# Patient Record
Sex: FEMALE | Race: BLACK OR AFRICAN AMERICAN | NOT HISPANIC OR LATINO | Employment: OTHER | URBAN - METROPOLITAN AREA
[De-identification: names, ages, dates, MRNs, and addresses within clinical notes are randomized per-mention and may not be internally consistent; named-entity substitution may affect disease eponyms.]

---

## 2017-12-25 ENCOUNTER — HOSPITAL ENCOUNTER (EMERGENCY)
Facility: OTHER | Age: 42
Discharge: HOME | End: 2017-12-25
Attending: EMERGENCY MEDICINE
Payer: MEDICAID

## 2017-12-25 VITALS
DIASTOLIC BLOOD PRESSURE: 72 MMHG | OXYGEN SATURATION: 97 % | HEIGHT: 69 IN | RESPIRATION RATE: 18 BRPM | HEART RATE: 88 BPM | WEIGHT: 293 LBS | TEMPERATURE: 98 F | SYSTOLIC BLOOD PRESSURE: 147 MMHG | BODY MASS INDEX: 43.4 KG/M2

## 2017-12-25 DIAGNOSIS — J45.901 EXACERBATION OF ASTHMA, UNSPECIFIED ASTHMA SEVERITY, UNSPECIFIED WHETHER PERSISTENT: Primary | ICD-10-CM

## 2017-12-25 DIAGNOSIS — R05.9 COUGH: ICD-10-CM

## 2017-12-25 LAB
B-HCG UR QL: NEGATIVE
CTP QC/QA: YES
FLUAV AG SPEC QL IA: NEGATIVE
FLUBV AG SPEC QL IA: NEGATIVE
SPECIMEN SOURCE: NORMAL

## 2017-12-25 PROCEDURE — 81025 URINE PREGNANCY TEST: CPT | Performed by: PHYSICIAN ASSISTANT

## 2017-12-25 PROCEDURE — 63600175 PHARM REV CODE 636 W HCPCS: Performed by: PHYSICIAN ASSISTANT

## 2017-12-25 PROCEDURE — 25000003 PHARM REV CODE 250: Performed by: PHYSICIAN ASSISTANT

## 2017-12-25 PROCEDURE — 87400 INFLUENZA A/B EACH AG IA: CPT | Mod: 59

## 2017-12-25 PROCEDURE — 99284 EMERGENCY DEPT VISIT MOD MDM: CPT | Mod: 25

## 2017-12-25 PROCEDURE — 25000242 PHARM REV CODE 250 ALT 637 W/ HCPCS: Performed by: PHYSICIAN ASSISTANT

## 2017-12-25 PROCEDURE — 94761 N-INVAS EAR/PLS OXIMETRY MLT: CPT

## 2017-12-25 PROCEDURE — 94640 AIRWAY INHALATION TREATMENT: CPT

## 2017-12-25 RX ORDER — IPRATROPIUM BROMIDE AND ALBUTEROL SULFATE 2.5; .5 MG/3ML; MG/3ML
3 SOLUTION RESPIRATORY (INHALATION)
Status: COMPLETED | OUTPATIENT
Start: 2017-12-25 | End: 2017-12-25

## 2017-12-25 RX ORDER — ACETAMINOPHEN 500 MG
1000 TABLET ORAL
Status: DISCONTINUED | OUTPATIENT
Start: 2017-12-25 | End: 2017-12-26 | Stop reason: HOSPADM

## 2017-12-25 RX ORDER — PREDNISONE 20 MG/1
60 TABLET ORAL
Status: COMPLETED | OUTPATIENT
Start: 2017-12-25 | End: 2017-12-25

## 2017-12-25 RX ORDER — ALBUTEROL SULFATE 90 UG/1
1-2 AEROSOL, METERED RESPIRATORY (INHALATION) EVERY 6 HOURS PRN
Qty: 1 INHALER | Refills: 0 | Status: SHIPPED | OUTPATIENT
Start: 2017-12-25 | End: 2018-12-25

## 2017-12-25 RX ORDER — PREDNISONE 20 MG/1
60 TABLET ORAL DAILY
Qty: 12 TABLET | Refills: 0 | Status: SHIPPED | OUTPATIENT
Start: 2017-12-26 | End: 2017-12-30

## 2017-12-25 RX ORDER — PROMETHAZINE HYDROCHLORIDE AND CODEINE PHOSPHATE 6.25; 1 MG/5ML; MG/5ML
5 SOLUTION ORAL
Status: COMPLETED | OUTPATIENT
Start: 2017-12-25 | End: 2017-12-25

## 2017-12-25 RX ADMIN — IPRATROPIUM BROMIDE AND ALBUTEROL SULFATE 3 ML: .5; 3 SOLUTION RESPIRATORY (INHALATION) at 05:12

## 2017-12-25 RX ADMIN — PROMETHAZINE HYDROCHLORIDE AND CODEINE PHOSPHATE 5 ML: 6.25; 1 SYRUP ORAL at 06:12

## 2017-12-25 RX ADMIN — PREDNISONE 60 MG: 20 TABLET ORAL at 06:12

## 2017-12-25 NOTE — ED NOTES
NEURO: Pt AAO x 4. Behavior and speech appropriate to situation.   CARDIAC: Regular rhythm auscultated  RESPIRATORY: Respirations even and unlabored. intermittent expiratory wheeze with fine rale to post RUL  MUSCULOSKELETAL: Active ROM noted to extremities

## 2017-12-25 NOTE — ED TRIAGE NOTES
Pt presents with cough, onset 1 week ago. Reports cough is sometimes productive with green sputum. Pt reports SOB after coughing, denies SOB at rest. Denies fever, NVD. HIV +, reports compliancy with medications. + CHF and asthma hx, reports hospitalization for asthma last year. Pt took theraflu and alkaseltzer cold plus without improvement. Pt in NAD.

## 2017-12-26 NOTE — ED PROVIDER NOTES
Encounter Date: 2017       History     Chief Complaint   Patient presents with    Cough     x1 week, worsening today, hx of asthma     Patient is a 42-year-old female with history of arthritis, asthma, CHF, HIV, and hypertension who presents to the emergency department with cough and wheezing.  Patient states over the last week her asthma has been acting up.  She reports cough with no sputum production.  She states she's been coughing so much that now the right side of her ribs are hurting.  She denies any lower extremity edema.  She states this feels occur typical asthma exacerbation.  She states she's been using her albuterol more frequently.  She states she does have history of HIV, but has been compliant with her antiviral medications.  She states she saw her PCP last month and was told that her numbers were good.  She denies any recent fevers, chills, or body aches.      The history is provided by the patient.     Review of patient's allergies indicates:  No Known Allergies  Past Medical History:   Diagnosis Date    Arthritis     Asthma     CHF (congestive heart failure)     HIV (human immunodeficiency virus infection)     Hypertension      Past Surgical History:   Procedure Laterality Date     SECTION       History reviewed. No pertinent family history.  Social History   Substance Use Topics    Smoking status: Never Smoker    Smokeless tobacco: Never Used    Alcohol use Yes      Comment: socially     Review of Systems   Constitutional: Negative for activity change, appetite change, chills, fatigue and fever.   HENT: Negative for congestion, ear discharge, ear pain, nosebleeds, postnasal drip, sore throat and trouble swallowing.    Respiratory: Positive for cough and wheezing.    Cardiovascular: Negative for chest pain.   Gastrointestinal: Negative for abdominal pain, blood in stool, constipation, diarrhea, nausea and vomiting.   Genitourinary: Negative for dysuria, flank pain and  hematuria.   Musculoskeletal: Negative for back pain, neck pain and neck stiffness.   Skin: Negative for rash and wound.   Neurological: Negative for dizziness, syncope, speech difficulty, weakness, light-headedness, numbness and headaches.       Physical Exam     Initial Vitals [12/25/17 1610]   BP Pulse Resp Temp SpO2   (!) 184/98 98 16 98.4 °F (36.9 °C) 98 %      MAP       126.67         Physical Exam    Nursing note and vitals reviewed.  Constitutional: She appears well-developed and well-nourished. She is not diaphoretic. She is Obese .  Non-toxic appearance. No distress.   HENT:   Head: Normocephalic.   Right Ear: Hearing, tympanic membrane, external ear and ear canal normal.   Left Ear: Hearing, tympanic membrane, external ear and ear canal normal.   Nose: Nose normal.   Mouth/Throat: Uvula is midline, oropharynx is clear and moist and mucous membranes are normal. Mucous membranes are not pale. No trismus in the jaw. No uvula swelling. No oropharyngeal exudate.   Eyes: Conjunctivae are normal. Pupils are equal, round, and reactive to light.   Neck: Normal range of motion.   Cardiovascular: Normal rate and regular rhythm.   No lower extremity edema   Pulmonary/Chest: No respiratory distress. She has wheezes (diffuse, expiratory bilaterally). She has no rhonchi. She has no rales. She exhibits no tenderness.   Abdominal: Soft. Bowel sounds are normal. There is no tenderness.   Lymphadenopathy:     She has no cervical adenopathy.   Neurological: She is alert and oriented to person, place, and time.   Skin: Skin is warm and dry. Capillary refill takes less than 2 seconds.   Psychiatric: She has a normal mood and affect.         ED Course   Procedures  Labs Reviewed   INFLUENZA A AND B ANTIGEN   POCT URINE PREGNANCY          X-Rays:   Independently Interpreted Readings:   Other Readings:  No acute cardiopulmonary process    Medical Decision Making:   Initial Assessment:   Urgent evaluation of a 42-year-old female  with history of hypertension, asthma, arthritis, CHF, and HIV who presents to the emergency department with cough and wheezing.  Patient is afebrile, nontoxic appearing, and hemodynamically stable.  On lung auscultation, there is diffuse expiratory wheezing bilaterally.  No lower extremity edema.  Patient reports being compliant with antiviral medications and states her last CD4 was good.  Patient states this feels like her typical asthma exacerbation.  Patient be treated with albuterol and prednisone.  Patient will be reevaluated.  ED Management:  On reevaluation, patient states she is feeling much better.  Chest x-ray reveals no acute cardiopulmonary process.  Patient is advised to follow-up with PCP or return to the emergency department with any worsening symptoms or concerns.  Other:   I have discussed this case with another health care provider.       <> Summary of the Discussion: Dr. Graf                   ED Course      Clinical Impression:   The primary encounter diagnosis was Exacerbation of asthma, unspecified asthma severity, unspecified whether persistent. A diagnosis of Cough was also pertinent to this visit.                           Iona Umanzor PA-C  12/25/17 1913

## 2018-01-01 ENCOUNTER — HOSPITAL ENCOUNTER (EMERGENCY)
Facility: OTHER | Age: 43
Discharge: HOME | End: 2018-01-01
Attending: EMERGENCY MEDICINE
Payer: MEDICAID

## 2018-01-01 VITALS
DIASTOLIC BLOOD PRESSURE: 65 MMHG | TEMPERATURE: 99 F | HEIGHT: 69 IN | HEART RATE: 96 BPM | OXYGEN SATURATION: 97 % | BODY MASS INDEX: 43.4 KG/M2 | SYSTOLIC BLOOD PRESSURE: 141 MMHG | WEIGHT: 293 LBS | RESPIRATION RATE: 20 BRPM

## 2018-01-01 DIAGNOSIS — K11.20 SIALOADENITIS: ICD-10-CM

## 2018-01-01 DIAGNOSIS — R05.9 COUGH: ICD-10-CM

## 2018-01-01 DIAGNOSIS — R07.81 RIB PAIN ON RIGHT SIDE: ICD-10-CM

## 2018-01-01 DIAGNOSIS — J45.909 UNCOMPLICATED ASTHMA, UNSPECIFIED ASTHMA SEVERITY, UNSPECIFIED WHETHER PERSISTENT: ICD-10-CM

## 2018-01-01 DIAGNOSIS — S29.011A MUSCLE STRAIN OF CHEST WALL, INITIAL ENCOUNTER: Primary | ICD-10-CM

## 2018-01-01 LAB
ALBUMIN SERPL BCP-MCNC: 2.9 G/DL
ALP SERPL-CCNC: 73 U/L
ALT SERPL W/O P-5'-P-CCNC: 11 U/L
ANION GAP SERPL CALC-SCNC: 9 MMOL/L
AST SERPL-CCNC: 12 U/L
B-HCG UR QL: NEGATIVE
BASOPHILS # BLD AUTO: 0 K/UL
BASOPHILS NFR BLD: 0 %
BILIRUB SERPL-MCNC: 0.5 MG/DL
BUN SERPL-MCNC: 8 MG/DL
CALCIUM SERPL-MCNC: 8.7 MG/DL
CHLORIDE SERPL-SCNC: 99 MMOL/L
CO2 SERPL-SCNC: 29 MMOL/L
CREAT SERPL-MCNC: 0.8 MG/DL
CTP QC/QA: YES
DIFFERENTIAL METHOD: ABNORMAL
EOSINOPHIL # BLD AUTO: 0.1 K/UL
EOSINOPHIL NFR BLD: 1.4 %
ERYTHROCYTE [DISTWIDTH] IN BLOOD BY AUTOMATED COUNT: 15.4 %
EST. GFR  (AFRICAN AMERICAN): >60 ML/MIN/1.73 M^2
EST. GFR  (NON AFRICAN AMERICAN): >60 ML/MIN/1.73 M^2
FLUAV AG SPEC QL IA: NEGATIVE
FLUBV AG SPEC QL IA: NEGATIVE
GLUCOSE SERPL-MCNC: 139 MG/DL
HCT VFR BLD AUTO: 37.4 %
HGB BLD-MCNC: 12.2 G/DL
LYMPHOCYTES # BLD AUTO: 0.7 K/UL
LYMPHOCYTES NFR BLD: 7.9 %
MCH RBC QN AUTO: 30.4 PG
MCHC RBC AUTO-ENTMCNC: 32.6 G/DL
MCV RBC AUTO: 93 FL
MONOCYTES # BLD AUTO: 0.8 K/UL
MONOCYTES NFR BLD: 8.9 %
NEUTROPHILS # BLD AUTO: 7 K/UL
NEUTROPHILS NFR BLD: 81 %
PLATELET # BLD AUTO: 186 K/UL
PMV BLD AUTO: 8.8 FL
POTASSIUM SERPL-SCNC: 4 MMOL/L
PROT SERPL-MCNC: 6.8 G/DL
RBC # BLD AUTO: 4.01 M/UL
SODIUM SERPL-SCNC: 137 MMOL/L
SPECIMEN SOURCE: NORMAL
WBC # BLD AUTO: 8.68 K/UL

## 2018-01-01 PROCEDURE — 87400 INFLUENZA A/B EACH AG IA: CPT | Mod: 59

## 2018-01-01 PROCEDURE — 96374 THER/PROPH/DIAG INJ IV PUSH: CPT

## 2018-01-01 PROCEDURE — 80053 COMPREHEN METABOLIC PANEL: CPT

## 2018-01-01 PROCEDURE — 25000242 PHARM REV CODE 250 ALT 637 W/ HCPCS: Performed by: EMERGENCY MEDICINE

## 2018-01-01 PROCEDURE — 99284 EMERGENCY DEPT VISIT MOD MDM: CPT | Mod: 25

## 2018-01-01 PROCEDURE — 81025 URINE PREGNANCY TEST: CPT | Performed by: EMERGENCY MEDICINE

## 2018-01-01 PROCEDURE — 94640 AIRWAY INHALATION TREATMENT: CPT

## 2018-01-01 PROCEDURE — 63600175 PHARM REV CODE 636 W HCPCS: Performed by: EMERGENCY MEDICINE

## 2018-01-01 PROCEDURE — 25500020 PHARM REV CODE 255: Performed by: EMERGENCY MEDICINE

## 2018-01-01 PROCEDURE — 85025 COMPLETE CBC W/AUTO DIFF WBC: CPT

## 2018-01-01 RX ORDER — KETOROLAC TROMETHAMINE 30 MG/ML
15 INJECTION, SOLUTION INTRAMUSCULAR; INTRAVENOUS
Status: COMPLETED | OUTPATIENT
Start: 2018-01-01 | End: 2018-01-01

## 2018-01-01 RX ORDER — BENZONATATE 100 MG/1
100 CAPSULE ORAL 3 TIMES DAILY PRN
Qty: 20 CAPSULE | Refills: 0 | Status: SHIPPED | OUTPATIENT
Start: 2018-01-01 | End: 2018-01-11

## 2018-01-01 RX ORDER — IPRATROPIUM BROMIDE AND ALBUTEROL SULFATE 2.5; .5 MG/3ML; MG/3ML
3 SOLUTION RESPIRATORY (INHALATION)
Status: COMPLETED | OUTPATIENT
Start: 2018-01-01 | End: 2018-01-01

## 2018-01-01 RX ADMIN — IOHEXOL 100 ML: 350 INJECTION, SOLUTION INTRAVENOUS at 08:01

## 2018-01-01 RX ADMIN — IPRATROPIUM BROMIDE AND ALBUTEROL SULFATE 3 ML: .5; 3 SOLUTION RESPIRATORY (INHALATION) at 07:01

## 2018-01-01 RX ADMIN — KETOROLAC TROMETHAMINE 15 MG: 30 INJECTION, SOLUTION INTRAMUSCULAR at 07:01

## 2018-01-01 NOTE — ED NOTES
"Pt to ED c/o SOB, cough, and swelling to right side of neck. Pt with hx of asthma, did not take inhaler. Pt reports woke up at 0300 with swelling to left neck, states "I feel like my thraat is tight." Airway open and patent at this time. Pt AAOx4 and appropriate at this time. Respirations even and mild tachypnia after ambulating to room.   "

## 2018-01-01 NOTE — ED NOTES
Two patient identifiers have been checked and are correct.      Appearance: Pt awake, alert & oriented to person, place & time. Pt in no acute distress at present time. Pt is clean and well groomed with clothes appropriately fastened.   Skin: Skin warm, dry & intact. Color consistent with ethnicity. Mucous membranes moist. No breakdown or brusing noted.   Musculoskeletal: Patient moving all extremities well, no obvious swelling or deformities noted.   Respiratory: Respirations spontaneous, even, tachypnic but not labored. Visible chest rise noted. Airway is open and patent. No accessory muscle use noted. +cough and SOB, BS tight  Neurologic: Sensation is intact. Speech is clear and appropriate. Eyes open spontaneously, behavior appropriate to situation, follows commands, facial expression symmetrical, bilateral hand grasp equal and even, purposeful motor response noted.  Cardiac: All peripheral pulses present. No Bilateral lower extremity edema. Cap refill is <3 seconds. +tachycardia  Abdomen: Abdomen soft, non-tender to palpation.   : Pt reports no dysuria or hematuria.

## 2018-01-02 NOTE — ED PROVIDER NOTES
"Encounter Date: 2018    SCRIBE #1 NOTE: I, Diana Young, am scribing for, and in the presence of, Dr. Hernandes.       History     Chief Complaint   Patient presents with    Shortness of Breath     SOB with cough and left sided neck swelling, pt with hx of asthma, pt states "I woke up at 0300 and it felt liek my throat was getting tight" airway open and patent      Time seen by provider: 6:52 PM    This is a 42 y.o. female who presents with complaint of lateral chest pain that began one week ago. She was seen in the ED one week ago after feeling something "pop" in her chest wall during coughing, but the pain has not subsided.     She complains of left-sided neck swelling.  She states that she woke up like this this morning.  There is no associated shortness of breath or overlying skin changes.      She reports congestion, sore throat, right ear pain, continued cough, headache.  She denies fever, chills, diaphoresis, nausea ,vomiting, myalgias, light headedness, dizziness, or numbness.      The history is provided by the patient.     Review of patient's allergies indicates:  No Known Allergies  Past Medical History:   Diagnosis Date    Arthritis     Asthma     CHF (congestive heart failure)     HIV (human immunodeficiency virus infection)     Hypertension      Past Surgical History:   Procedure Laterality Date     SECTION       History reviewed. No pertinent family history.  Social History   Substance Use Topics    Smoking status: Never Smoker    Smokeless tobacco: Never Used    Alcohol use Yes      Comment: socially     Review of Systems   Constitutional: Negative for chills, diaphoresis and fever.   HENT: Positive for congestion, ear pain and sore throat. Negative for trouble swallowing.    Eyes: Negative for visual disturbance.   Respiratory: Positive for cough. Negative for shortness of breath.    Cardiovascular: Positive for chest pain. Negative for palpitations and leg swelling. "   Gastrointestinal: Negative for abdominal pain, diarrhea, nausea and vomiting.   Genitourinary: Negative for decreased urine volume, dysuria and vaginal discharge.   Musculoskeletal: Negative for joint swelling, myalgias, neck pain and neck stiffness.   Skin: Negative for rash and wound.   Neurological: Positive for headaches. Negative for dizziness, weakness, light-headedness and numbness.   Psychiatric/Behavioral: Negative for confusion.       Physical Exam     Initial Vitals [01/01/18 1727]   BP Pulse Resp Temp SpO2   (!) 187/94 (!) 116 18 98.7 °F (37.1 °C) 97 %      MAP       125         Vitals:    01/01/18 1917 01/01/18 1933 01/01/18 1948 01/01/18 1950   BP: (!) 156/94  (!) 153/64    Pulse: 100 109 (!) 120 98   Resp:  20     Temp:       TempSrc:       SpO2: 96% 100% 98% 97%   Weight:       Height:        01/01/18 2018   BP: (!) 141/65   Pulse: 96   Resp:    Temp:    TempSrc:    SpO2: 97%   Weight:    Height:        Physical Exam    Nursing note and vitals reviewed.  Constitutional: She appears well-developed and well-nourished. She is not diaphoretic. No distress.   HENT:   Head: Normocephalic and atraumatic.   Mouth/Throat: Oropharynx is clear and moist. No oropharyngeal exudate.   Nose: Swollen turbinates.   Eyes: Conjunctivae and EOM are normal. Pupils are equal, round, and reactive to light.   Neck: Normal range of motion. Neck supple.   Cardiovascular: Normal rate, regular rhythm and normal heart sounds. Exam reveals no gallop and no friction rub.    No murmur heard.  Pulmonary/Chest: No respiratory distress. She has wheezes (Faint expiratory). She has no rhonchi. She has no rales.   Abdominal: Soft. Bowel sounds are normal. She exhibits no distension. There is no tenderness. There is no rebound and no guarding.   Musculoskeletal: Normal range of motion. She exhibits no edema.   Tenderness with moderate swelling under left neck by angle of jaw. No obvious palpable mass. TTP over right lateral rib cage.    Neurological: She is alert and oriented to person, place, and time.   Skin: Skin is warm and dry. No rash noted. No pallor.   Psychiatric: She has a normal mood and affect. Her behavior is normal.         ED Course   Procedures  Labs Reviewed   CBC W/ AUTO DIFFERENTIAL - Abnormal; Notable for the following:        Result Value    RDW 15.4 (*)     MPV 8.8 (*)     Lymph # 0.7 (*)     Gran% 81.0 (*)     Lymph% 7.9 (*)     All other components within normal limits   COMPREHENSIVE METABOLIC PANEL - Abnormal; Notable for the following:     Glucose 139 (*)     Albumin 2.9 (*)     All other components within normal limits   POCT URINE PREGNANCY - Normal   INFLUENZA A AND B ANTIGEN     Imaging Results          CT Soft Tissue Neck With Contrast (Final result)  Result time 01/01/18 20:50:06    Final result by Mik Ventura MD (01/01/18 20:50:06)                 Impression:       Inflammatory change surrounding the superficial lobe of the left parotid gland with extension into the overlying platysma and adjacent subcutaneous tissues.  No evidence of a drainable collection.  The findings are suggestive of mild sialoadenitis of the left parotid gland.  No stones are identified along the course of the parotid duct.  Short-term followup suggested.    Reactive subcentimeter cervical lymph nodes.    Paranasal sinus disease.    No CT evidence of airway compromise.            Electronically signed by: MIK VENTURA MD  Date:     01/01/18  Time:    20:50              Narrative:    Exam: [id number]  01/01/18  20:10:27 [id number] ([facility]) : CT SOFT TISSUE NECK WITH CONTRAST    Technique:    Axial CT scan of the neck was performed from the level of the thoracic inlet to the skull base after the intravenous administration of 100 cc of Omni 350 IV. Coronal and Sagittal Reformats were also obtained.     Comparison:    CT scan of the chest dated 4/23/16    Findings:      The visualized intracranial compartment is within normal limits.  The orbits and  intraorbital contents are within normal limits.      There is significant thickening of the left ethmoid sinus.  There is also fluid noted in the left frontal sinus and bilateral frontal recesses.  There is trace mucosal thickening in bilateral maxillary sinuses. The reminder of the paranasal sinuses and mastoid air cells are clear.    The nasal cavity is unremarkable.  The nasopharynx and oropharynx are unremarkable.  The larynx is within normal limits.  The parapharyngeal fat is preserved.  No retropharyngeal effusion is identified.  The airways are patent.    The right parotid gland and the submandibular glands are within normal limits.  Thyroid gland is within normal limits.  There are inflammatory changes in the superficial lobe of the left parotid gland.  Inflammatory changes extend into the overlying platysma.  No drainable collection is identified.  There are reactive submental, submandibular, and internal jugular chain lymph nodes.  These are not enlarged by size criteria.    There is multi-carious dental disease with multiple periapical lucencies.  No evidence of peridental abscess is identified, allowing for streak artifact from the dental amalgam.    There is normal enhancement of the right internal jugular vein.  The left internal jugular vein appears hypoplastic.  No definite evidence of thrombosis identified.  There is retropharyngeal course of the bilateral cervical ICAs.    There is straightening of the normal cervical lordosis.  No evidence of a fracture or erosive changes identified in the cervical spine.    There are bullous changes in the visualized lung apices.  No airspace opacities identified.                             X-Ray Ribs 2 View Right (Final result)  Result time 01/01/18 19:53:31    Final result by Mik Ventura MD (01/01/18 19:53:31)                 Impression:       No acute process.               Electronically signed by: MIK VENTURA MD  Date:     01/01/18  Time:    19:53               Narrative:    Exam: [id number]  01/01/18  19:22:08 IMG47 ([facility]) : XR RIBS 2 VIEW RIGHT    Technique:    3 x-ray views of the right sided ribs.    Comparison:    12/25/2017    Findings:      The visualized portions of the right shoulder is unremarkable.  No evidence of a pneumothorax is identified. No pulmonary contusion is evident. No evidence of a displaced rib fracture is present.  The visualized portions of the thoracic spine are within normal limits.                             X-Ray Chest PA And Lateral (Final result)  Result time 01/01/18 19:51:45    Final result by Mik Ventura MD (01/01/18 19:51:45)                 Impression:       No acute process.              Electronically signed by: MIK VENTURA MD  Date:     01/01/18  Time:    19:51              Narrative:    Exam: [id number]  01/01/18  19:22:04 IMG36 ([facility]) : XR CHEST PA AND LATERAL    Technique:    Frontal and lateral chest x-ray.    Comparison:    12/25/2017    Findings:      The trachea is unremarkable.  The cardiomediastinal silhouette is within normal limits.  The hemidiaphragms are unremarkable.  There is no evidence of free air beneath the hemidiaphragms.  No pleural effusions are present.  There is no evidence of a pneumothorax.  There is no evidence of pneumomediastinum.  No airspace opacities are present.  The osseous structures are unremarkable.  The subcutaneous tissues are within normal limits.                                   X-Rays:   Independently Interpreted Readings:   Chest X-Ray: No obvious infiltrates, effusion, or pneumothorax.     Medical Decision Making:   Clinical Tests:   Lab Tests: Ordered and Reviewed  Radiological Study: Reviewed and Ordered  ED Management:  Emergent evaluation a 42-year-old female with multiple complaints.  Although she was triaged or shortness of breath, she denied this to me.  She did have some faint wheezing on exam, which resolved after a DuoNeb.  To me, her main complaints were left-sided neck  swelling and ongoing right-sided chest wall pain with cough.  On exam the chest pain was very reproducible and I was concerned for rib fracture.  No rib fracture is seen on the rib x-rays.  No acute findings on chest x-ray.  Flu swab is negative.  CT performed which showed the neck swelling is sialoadenitis.  This is likely viral given her constellation of current symptoms.  She was advised to follow-up with her PCP to ensure resolution and to return to the ER if swelling or pain increased, redness overlying, or developing fever.  Labs are reassuring and I am comfortable with discharge home.  She is given prescription for Tessalon for cough and advised to take Tylenol or ibuprofen for pain.              Attending Attestation:           Physician Attestation for Scribe:  Physician Attestation Statement for Scribe #1: I, Dr. Hernandes, reviewed documentation, as scribed by Diana Young in my presence, and it is both accurate and complete.                 ED Course      Clinical Impression:     1. Muscle strain of chest wall, initial encounter    2. Rib pain on right side    3. Cough    4. Sialoadenitis    5. Uncomplicated asthma, unspecified asthma severity, unspecified whether persistent                                 Domenica Hernandes MD  01/03/18 [id number]

## 2018-01-02 NOTE — ED NOTES
Patient lying Left side lateral on stretcher, AAOx4.  No apparent SOB or distress noted.  Responds to verbal and tactile stimuli.  HOB elevated at a 45 degree angle.  Continuous monitoring in place.  Comfort measures addressed.  Call bell within reach. Will continue to monitor.